# Patient Record
Sex: MALE | Race: OTHER | Employment: OTHER | URBAN - METROPOLITAN AREA
[De-identification: names, ages, dates, MRNs, and addresses within clinical notes are randomized per-mention and may not be internally consistent; named-entity substitution may affect disease eponyms.]

---

## 2018-05-25 NOTE — PATIENT DISCUSSION
POSTERIOR CAPSULAR FIBROSIS OU: YAG Cap not indicated at this time, will continue to monitor. New SRx given to pt. Instructed patient to return to clinic sooner if vision worsens/changes.

## 2019-05-23 NOTE — PATIENT DISCUSSION
DRY EYE OU:  I have explained that dry eye syndrome may cause many ocular symptoms including irritation, burning, tearing, and blurry vision. Frequent high quality artificial tears will help relieve these symptoms. Recommend patient use over the counter artificial tears at least four times daily. The patient was given a list of quality artificial tears to choose from (Genteal, Systane, Refresh, TheraTears, Blink) and was advised not to use Visine or Clear Eyes. Advised patient that if symptoms of discomfort did not improve or if they worse, then the patient should return to clinic. Will continue to monitor.  GENTEAL GEL QHS OU

## 2019-05-23 NOTE — PATIENT DISCUSSION
DISCUSSED PCO WITH PATIENT, BUT PATIENT DEFERRED DILATION TODAY. EXPLAINED TO PATIENT THAT HIS VISION IS DOING WELL AND THAT THE PCO IS NOT RESPONSIBLE FOR OCCASIONAL VISION BLUR AS THAT SOUND MORE LIKE DRYNESS. PCO WOULD BE MORE OF A CONSTANT VISION BLUR. PATIENT UNDERSTANDS.

## 2019-07-17 NOTE — PATIENT DISCUSSION
PVD OU, NEW ONSET OS, ESTABLISHED OD: NO RETINAL TEARS/DETACHMENTS. EDUCATED PATIENT ABOUT SIGNS AND SYMPTOMS OF RETINAL DETACHMENT TO INCLUDE SUDDEN CHANGE IN VISION, CURTAINS/VEILS IN VISION, ADDITIONAL FLOATERS, AND FLASHES OF LIGHTS IN VISION. FOLLOW-UP AS DIRECTED.

## 2019-07-17 NOTE — PATIENT DISCUSSION
POSTERIOR CAPSULAR FIBROSIS OU, OS&gt;&gt;OD: VISION IS AFFECTED. RECOMMEND YAG EVALUATION WITH DR. Valentino Peña. VERY BOTHERED AT NIGHT.

## 2019-07-18 NOTE — PATIENT DISCUSSION
DRY EYES : Discussed with patient the importance of keeping the eye moist and the symptoms associated with dry eyes including blurry vision, tearing, burning, and abiola sensation. Advised patient to minimize use of any fans blowing directly on the face. Advised patient to continue with artificial tears 2-3 times daily.

## 2019-07-18 NOTE — PATIENT DISCUSSION
POSTERIOR CAPSULAR FIBROSIS, OU - VISUALLY SIGNIFICANT.   SCHEDULE YAG CAPSULOTOMY OS THEN WAIT ON OD FOR NOW

## 2019-08-19 NOTE — PATIENT DISCUSSION
YAG POSTOP OS: ALL LOOKS GOOD. PT IS TO USE ARTIFICIAL TEARS FOR DRYNESS. RX GIVEN FOR GLASSES.  WOK TO SCHEDULE YAG OD

## 2019-09-09 NOTE — PATIENT DISCUSSION
FINAL YAG OU PO- Follow up with yearly exam and start to increase the amount of articial tears being instilled .

## 2022-09-02 ENCOUNTER — NEW PATIENT (OUTPATIENT)
Dept: URBAN - METROPOLITAN AREA CLINIC 36 | Facility: CLINIC | Age: 67
End: 2022-09-02

## 2022-09-02 DIAGNOSIS — E11.9: ICD-10-CM

## 2022-09-02 DIAGNOSIS — Z96.1: ICD-10-CM

## 2022-09-02 DIAGNOSIS — H40.1134: ICD-10-CM

## 2022-09-02 DIAGNOSIS — H52.7: ICD-10-CM

## 2022-09-02 DIAGNOSIS — H35.373: ICD-10-CM

## 2022-09-02 PROCEDURE — 92015 DETERMINE REFRACTIVE STATE: CPT

## 2022-09-02 PROCEDURE — 92004 COMPRE OPH EXAM NEW PT 1/>: CPT

## 2022-09-02 ASSESSMENT — VISUAL ACUITY
OS_SC: J5
OD_SC: 20/20-1
OS_CC: J1
OD_CC: J1
OD_SC: J6
OS_SC: 20/20

## 2022-09-02 ASSESSMENT — TONOMETRY
OD_IOP_MMHG: 19
OS_IOP_MMHG: 18

## 2022-10-27 NOTE — PATIENT DISCUSSION
Discussed condition and exacerbating conditions/situations (e.g., dry/arid environments, overhead fans, air conditioners, side effect of medications). Ibstructed patient to start regiment of artificial tears 4-5 times daily to improve ocular symptoms.

## 2022-12-30 ENCOUNTER — FOLLOW UP (OUTPATIENT)
Dept: URBAN - METROPOLITAN AREA CLINIC 36 | Facility: CLINIC | Age: 67
End: 2022-12-30

## 2022-12-30 PROCEDURE — 92083 EXTENDED VISUAL FIELD XM: CPT

## 2022-12-30 PROCEDURE — 92012 INTRM OPH EXAM EST PATIENT: CPT

## 2022-12-30 ASSESSMENT — VISUAL ACUITY
OD_SC: J10
OS_SC: J6
OD_SC: 20/20-2
OS_SC: 20/20

## 2022-12-30 ASSESSMENT — TONOMETRY
OS_IOP_MMHG: 24
OD_IOP_MMHG: 24

## 2023-01-26 ENCOUNTER — FOLLOW UP (OUTPATIENT)
Dept: URBAN - METROPOLITAN AREA CLINIC 36 | Facility: CLINIC | Age: 68
End: 2023-01-26

## 2023-01-26 DIAGNOSIS — H40.1131: ICD-10-CM

## 2023-01-26 PROCEDURE — 92012 INTRM OPH EXAM EST PATIENT: CPT

## 2023-01-26 PROCEDURE — 76514 ECHO EXAM OF EYE THICKNESS: CPT

## 2023-01-26 RX ORDER — LATANOPROST 50 UG/ML: 1 SOLUTION/ DROPS OPHTHALMIC EVERY EVENING

## 2023-01-26 ASSESSMENT — TONOMETRY
OS_IOP_MMHG: 18
OD_IOP_MMHG: 18

## 2023-01-26 ASSESSMENT — VISUAL ACUITY
OS_SC: 20/20-2
OD_SC: 20/20

## 2023-01-26 ASSESSMENT — PACHYMETRY
OS_CT_UM: 651
OD_CT_UM: 643

## 2023-08-02 ENCOUNTER — COMPREHENSIVE EXAM (OUTPATIENT)
Dept: URBAN - METROPOLITAN AREA CLINIC 36 | Facility: CLINIC | Age: 68
End: 2023-08-02

## 2023-08-02 DIAGNOSIS — H52.7: ICD-10-CM

## 2023-08-02 DIAGNOSIS — H40.1131: ICD-10-CM

## 2023-08-02 DIAGNOSIS — Z96.1: ICD-10-CM

## 2023-08-02 DIAGNOSIS — E11.9: ICD-10-CM

## 2023-08-02 DIAGNOSIS — H35.373: ICD-10-CM

## 2023-08-02 DIAGNOSIS — H21.231: ICD-10-CM

## 2023-08-02 PROCEDURE — 92014 COMPRE OPH EXAM EST PT 1/>: CPT

## 2023-08-02 PROCEDURE — 92133 CPTRZD OPH DX IMG PST SGM ON: CPT

## 2023-08-02 ASSESSMENT — VISUAL ACUITY
OS_SC: 20/20
OD_SC: 20/20
OS_SC: J8
OD_SC: J8

## 2023-08-02 ASSESSMENT — TONOMETRY
OD_IOP_MMHG: 20
OS_IOP_MMHG: 15

## 2024-02-07 ENCOUNTER — FOLLOW UP (OUTPATIENT)
Dept: URBAN - METROPOLITAN AREA CLINIC 36 | Facility: CLINIC | Age: 69
End: 2024-02-07

## 2024-02-07 DIAGNOSIS — H11.32: ICD-10-CM

## 2024-02-07 DIAGNOSIS — H40.1131: ICD-10-CM

## 2024-02-07 PROCEDURE — 92083 EXTENDED VISUAL FIELD XM: CPT

## 2024-02-07 PROCEDURE — 92012 INTRM OPH EXAM EST PATIENT: CPT

## 2024-02-07 ASSESSMENT — VISUAL ACUITY
OD_SC: 20/20
OS_SC: 20/20-1

## 2024-02-07 ASSESSMENT — TONOMETRY
OS_IOP_MMHG: 17
OD_IOP_MMHG: 21

## 2024-10-02 ENCOUNTER — COMPREHENSIVE EXAM (OUTPATIENT)
Dept: URBAN - METROPOLITAN AREA CLINIC 36 | Facility: CLINIC | Age: 69
End: 2024-10-02

## 2024-10-02 DIAGNOSIS — Z96.1: ICD-10-CM

## 2024-10-02 DIAGNOSIS — H21.231: ICD-10-CM

## 2024-10-02 DIAGNOSIS — E11.9: ICD-10-CM

## 2024-10-02 DIAGNOSIS — H40.1131: ICD-10-CM

## 2024-10-02 DIAGNOSIS — H35.373: ICD-10-CM

## 2024-10-02 PROCEDURE — 92014 COMPRE OPH EXAM EST PT 1/>: CPT

## 2024-10-02 PROCEDURE — 92133 CPTRZD OPH DX IMG PST SGM ON: CPT

## 2025-04-03 ENCOUNTER — FOLLOW UP (OUTPATIENT)
Age: 70
End: 2025-04-03

## 2025-04-03 DIAGNOSIS — H40.1131: ICD-10-CM

## 2025-04-03 PROCEDURE — 92012 INTRM OPH EXAM EST PATIENT: CPT

## 2025-04-03 PROCEDURE — 92083 EXTENDED VISUAL FIELD XM: CPT
